# Patient Record
Sex: MALE | Race: WHITE
[De-identification: names, ages, dates, MRNs, and addresses within clinical notes are randomized per-mention and may not be internally consistent; named-entity substitution may affect disease eponyms.]

---

## 2022-11-04 ENCOUNTER — HOSPITAL ENCOUNTER (EMERGENCY)
Dept: HOSPITAL 26 - MED | Age: 57
Discharge: HOME | End: 2022-11-04
Payer: COMMERCIAL

## 2022-11-04 VITALS — HEIGHT: 68 IN | BODY MASS INDEX: 26.52 KG/M2 | WEIGHT: 175 LBS

## 2022-11-04 VITALS — SYSTOLIC BLOOD PRESSURE: 165 MMHG | DIASTOLIC BLOOD PRESSURE: 89 MMHG

## 2022-11-04 VITALS — DIASTOLIC BLOOD PRESSURE: 80 MMHG | SYSTOLIC BLOOD PRESSURE: 147 MMHG
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
## 2022-11-04 DIAGNOSIS — S16.1XXA: Primary | ICD-10-CM
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
## 2022-11-04 DIAGNOSIS — M54.6: ICD-10-CM
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
## 2022-11-04 DIAGNOSIS — E11.9: ICD-10-CM

## 2022-11-04 PROCEDURE — 96372 THER/PROPH/DIAG INJ SC/IM: CPT

## 2022-11-04 PROCEDURE — 72128 CT CHEST SPINE W/O DYE: CPT

## 2022-11-04 PROCEDURE — 99284 EMERGENCY DEPT VISIT MOD MDM: CPT

## 2022-11-04 PROCEDURE — 72125 CT NECK SPINE W/O DYE: CPT
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
